# Patient Record
Sex: MALE
[De-identification: names, ages, dates, MRNs, and addresses within clinical notes are randomized per-mention and may not be internally consistent; named-entity substitution may affect disease eponyms.]

---

## 2020-07-20 DIAGNOSIS — R29.898 OTHER SYMPTOMS AND SIGNS INVOLVING THE MUSCULOSKELETAL SYSTEM: ICD-10-CM

## 2020-07-20 PROBLEM — Z00.00 ENCOUNTER FOR PREVENTIVE HEALTH EXAMINATION: Status: ACTIVE | Noted: 2020-07-20

## 2020-08-04 ENCOUNTER — APPOINTMENT (OUTPATIENT)
Dept: NEUROLOGY | Facility: CLINIC | Age: 55
End: 2020-08-04
Payer: COMMERCIAL

## 2020-08-04 VITALS
DIASTOLIC BLOOD PRESSURE: 77 MMHG | BODY MASS INDEX: 30.88 KG/M2 | TEMPERATURE: 98.5 F | HEART RATE: 65 BPM | WEIGHT: 228 LBS | SYSTOLIC BLOOD PRESSURE: 122 MMHG | HEIGHT: 72 IN | OXYGEN SATURATION: 99 %

## 2020-08-04 PROCEDURE — 95912 NRV CNDJ TEST 11-12 STUDIES: CPT

## 2020-08-04 PROCEDURE — 99214 OFFICE O/P EST MOD 30 MIN: CPT | Mod: 25

## 2020-08-04 PROCEDURE — 95885 MUSC TST DONE W/NERV TST LIM: CPT

## 2020-08-04 NOTE — HISTORY OF PRESENT ILLNESS
[FreeTextEntry1] : CC: leg weakness, imbalance\par \par HPI: 55 year old man here for evaluation of multiple symptoms including leg weakness (feeling "wobbly"), burning in thighs, numbness and tingling in his legs and fingers, imbalance\par \par Location: bilateral legs and hands / fingers\par Quality: numbness, tingling, burning \par Severity: moderate; pain is currently 2/10\par Duration: 2-3 years\par Timing: constant\par Modifying Factors: none\par Associated signs and symptoms: joint pain, dizziness (feeling like he is "going to collapse")\par \par Data reviewed:\par Labs: A1C reportedly mildly elevated\par Imaging (reports): x-ray right shoulder - degenerative disease\par \par ROS: 13 pt review of systems performed and reviewed with patient (General, Eyes, Ears, Cardiovascular, Respiratory, Gastrointestinal, Genitourinary, Musculoskeletal, Skin, Endocrine, Hematologic, Psychiatric, Neurologic)\par Past medical history, surgical history, social history, and family history reviewed with patient\par See scanned document for details

## 2020-08-04 NOTE — PROCEDURE
[FreeTextEntry1] : Nerve Conduction and Electromyography Report [FreeTextEntry3] : Electro Physiologic Findings:\par \par Limb temperature was monitored and maintained at approximately 30 – 34° C in the lower extremities, and 32 – 36° C in the upper extremities.\par \par The right median sensory amplitude was low, with mild slowing across the wrist compared to the distal sensory short segment. The right radial sensory amplitude was low with normal velocity. The bilateral superficial fibular sensory responses were also low amplitude with normal velocity. The left sural sensory response was normal. \par \par The right median motor response was normal. The bilateral fibular and tibial motor responses, including F-wave latencies, were normal bilaterally and symmetric. \par \par Needle electromyography was performed on the bilateral tibialis anterior and gastrocnemius muscles. All muscles tested were normal without evidence of active or chronic denervation. 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 There was also a mild right median nerve entrapment at the wrist.

## 2020-08-04 NOTE — PHYSICAL EXAM
[FreeTextEntry1] : Gen: appears well, well-nourished, no acute distress\par \par MS: awake, alert, oriented, speech fluent, comprehension intact, good fund of knowledge, recent and remote memory intact, attention intact\par \par CN: PERRL, EOMI, visual fields full, facial strength and sensation intact and symmetric, palate elevation symmetric, tongue midline, no tongue atrophy or fasciculations\par \par Motor: normal bulk and tone, 5/5 strength throughout, no abnormal movements\par \par Sensory: vibration mildly reduced at big toes b/l; LT, PP, cold sensation intact and  symmetric throughout \par \par Reflexes: 2+ symmetric throughout, no Posadas’s sign, plantar responses flexor bilaterally\par \par Coordination: no dysmetria on finger to nose, Romberg negative\par \par Gait: normal, can tandem without difficulty\par \par Tinel's sign mildly positive at right wrist and  b/l elbows

## 2020-08-04 NOTE — ASSESSMENT
[FreeTextEntry1] : NCS/EMG demonstrated a mild predominantly sensory axonal polyneuropathy in the upper and lower extremities, and a mild right median nerve entrapment at the wrist\par Will check labs for common / potentially reversible causes of neuropathy, as well as RF, ESR, ANATOLIY due to complaint of multiple joint pain (although that is likely unrelated) \par Start alpha-lipoic acid 300mg BID\par f/u in 6 months, repeat NCS/EMG in 1 year\par \par See separate procedure note for full results of study.

## 2020-08-05 LAB
ERYTHROCYTE [SEDIMENTATION RATE] IN BLOOD BY WESTERGREN METHOD: 11 MM/HR
ESTIMATED AVERAGE GLUCOSE: 111 MG/DL
FOLATE SERPL-MCNC: 12.5 NG/ML
HBA1C MFR BLD HPLC: 5.5 %
RHEUMATOID FACT SER QL: <10 IU/ML
TSH SERPL-ACNC: 1.63 UIU/ML
VIT B12 SERPL-MCNC: 409 PG/ML

## 2020-08-06 LAB
ALBUMIN MFR SERPL ELPH: 63.4 %
ALBUMIN SERPL-MCNC: 4.5 G/DL
ALBUMIN/GLOB SERPL: 1.7 RATIO
ALPHA1 GLOB MFR SERPL ELPH: 3.2 %
ALPHA1 GLOB SERPL ELPH-MCNC: 0.2 G/DL
ALPHA2 GLOB MFR SERPL ELPH: 7.1 %
ALPHA2 GLOB SERPL ELPH-MCNC: 0.5 G/DL
ANA SER IF-ACNC: NEGATIVE
B-GLOBULIN MFR SERPL ELPH: 10.3 %
B-GLOBULIN SERPL ELPH-MCNC: 0.7 G/DL
GAMMA GLOB FLD ELPH-MCNC: 1.1 G/DL
GAMMA GLOB MFR SERPL ELPH: 16 %
INTERPRETATION SERPL IEP-IMP: NORMAL
M PROTEIN SPEC IFE-MCNC: NORMAL
PROT SERPL-MCNC: 7.1 G/DL
PROT SERPL-MCNC: 7.1 G/DL

## 2020-08-10 LAB
HOMOCYSTEINE LEVEL: 10.7 UMOL/L
METHYLMALONIC ACID LEVEL: 197 NMOL/L

## 2023-01-18 ENCOUNTER — APPOINTMENT (OUTPATIENT)
Dept: ORTHOPEDIC SURGERY | Facility: CLINIC | Age: 58
End: 2023-01-18

## 2023-10-16 ENCOUNTER — APPOINTMENT (OUTPATIENT)
Dept: ORTHOPEDIC SURGERY | Facility: CLINIC | Age: 58
End: 2023-10-16
Payer: COMMERCIAL

## 2023-10-16 DIAGNOSIS — Z78.9 OTHER SPECIFIED HEALTH STATUS: ICD-10-CM

## 2023-10-16 DIAGNOSIS — Z87.09 PERSONAL HISTORY OF OTHER DISEASES OF THE RESPIRATORY SYSTEM: ICD-10-CM

## 2023-10-16 DIAGNOSIS — G89.29 PAIN IN RIGHT SHOULDER: ICD-10-CM

## 2023-10-16 DIAGNOSIS — M25.511 PAIN IN RIGHT SHOULDER: ICD-10-CM

## 2023-10-16 DIAGNOSIS — M54.2 CERVICALGIA: ICD-10-CM

## 2023-10-16 DIAGNOSIS — G89.29 CERVICALGIA: ICD-10-CM

## 2023-10-16 PROCEDURE — 99204 OFFICE O/P NEW MOD 45 MIN: CPT

## 2023-10-16 RX ORDER — ETODOLAC 400 MG/1
400 TABLET, FILM COATED ORAL
Qty: 60 | Refills: 0 | Status: ACTIVE | COMMUNITY
Start: 2023-10-16 | End: 1900-01-01

## 2023-12-21 ENCOUNTER — APPOINTMENT (OUTPATIENT)
Dept: NEUROLOGY | Facility: CLINIC | Age: 58
End: 2023-12-21
Payer: COMMERCIAL

## 2023-12-21 VITALS
WEIGHT: 226 LBS | BODY MASS INDEX: 30.61 KG/M2 | SYSTOLIC BLOOD PRESSURE: 128 MMHG | HEIGHT: 72 IN | HEART RATE: 75 BPM | DIASTOLIC BLOOD PRESSURE: 80 MMHG | TEMPERATURE: 96.7 F | OXYGEN SATURATION: 98 %

## 2023-12-21 DIAGNOSIS — R20.2 PARESTHESIA OF SKIN: ICD-10-CM

## 2023-12-21 DIAGNOSIS — G62.9 POLYNEUROPATHY, UNSPECIFIED: ICD-10-CM

## 2023-12-21 PROCEDURE — 95913 NRV CNDJ TEST 13/> STUDIES: CPT

## 2023-12-21 PROCEDURE — 99213 OFFICE O/P EST LOW 20 MIN: CPT | Mod: 25

## 2023-12-21 NOTE — PROCEDURE
[FreeTextEntry1] : Nerve Conduction and Electromyography Report [FreeTextEntry3] :  Electro Physiologic Findings:  Limb temperature was monitored and maintained at approximately 30 - 34 C in the lower extremities, and 32 - 36 C in the upper extremities.  The median sensory responses were mildly low amplitude bilaterally with normal velocity. The ulnar sensory responses were normal bilaterally. The superficial fibular sensory responses were mildly low amplitude with normal velocity. The left sural sensory response was borderline amplitude and mildly slow.   The median motor responses were normal bilaterally and symmetric. The ulnar motor responses were mildly focally slow across the elbows without conduction block. The right tibial and fibular motor responses were normal. The right fibular, right tibial, and bilateral median and ulnar F-wave latencies were normal.   Clinical Electrophysiological Impression:   This repeat nerve conduction study again demonstrated a mild sensory polyneuropathy in the upper and lower extremities. There were also mild bilateral ulnar nerve entrapments at the elbows which were not tested on the last study. There was no definite median nerve entrapment on either side.

## 2023-12-26 PROBLEM — R20.2 PARESTHESIA: Status: ACTIVE | Noted: 2020-07-20

## 2023-12-26 NOTE — PHYSICAL EXAM
[FreeTextEntry1] : Motor: EHL 4/5 b/l, otherwise 5/5 throughout Sensory: vibration moderately reduced at toes and mild at ankles b/l; Tinel's sign present at right wrist and b/l elbows  Reflexes: 1+ UE and right patellar, absent left patellar and b/l achilles

## 2023-12-26 NOTE — HISTORY OF PRESENT ILLNESS
[FreeTextEntry1] : Last seen 3 years ago for numbness and tingling in the feet Now returns with similar symptoms - fluctuate in severity, overall about stable, although now with some symptoms in the hands at times  Reviewed: MRI brain - normal other than sinusitis MRI C spine - multilevel degenerative disc disease with foraminal narrowing C3/4 - C6/7 Notes from ortho spine Labs below

## 2023-12-26 NOTE — ASSESSMENT
[FreeTextEntry1] : NCS again demonstrated a mild sensory neuropathy, no significant difference from prior study in 2020. Blood work taken then was unremarkable indicating neuropathy is possibly idiopathic or mild genetic neuropathy Will monitor clinically, instructed to contact me if there is significant worsening  See separate procedure note for full results of NCS/EMG study